# Patient Record
Sex: FEMALE | Race: WHITE | NOT HISPANIC OR LATINO | Employment: STUDENT | URBAN - METROPOLITAN AREA
[De-identification: names, ages, dates, MRNs, and addresses within clinical notes are randomized per-mention and may not be internally consistent; named-entity substitution may affect disease eponyms.]

---

## 2017-01-06 ENCOUNTER — OFFICE VISIT (OUTPATIENT)
Dept: FAMILY MEDICINE CLINIC | Facility: CLINIC | Age: 22
End: 2017-01-06

## 2017-01-06 VITALS
WEIGHT: 246 LBS | DIASTOLIC BLOOD PRESSURE: 78 MMHG | HEIGHT: 64 IN | OXYGEN SATURATION: 99 % | HEART RATE: 90 BPM | BODY MASS INDEX: 42 KG/M2 | TEMPERATURE: 98.3 F | SYSTOLIC BLOOD PRESSURE: 122 MMHG

## 2017-01-06 DIAGNOSIS — J02.9 SORE THROAT: Primary | ICD-10-CM

## 2017-01-06 LAB
EXPIRATION DATE: NORMAL
EXPIRATION DATE: NORMAL
HETEROPH AB SER QL LA: NEGATIVE
INTERNAL CONTROL: NORMAL
INTERNAL CONTROL: NORMAL
Lab: NORMAL
Lab: NORMAL
S PYO AG THROAT QL: NEGATIVE

## 2017-01-06 PROCEDURE — 87880 STREP A ASSAY W/OPTIC: CPT | Performed by: NURSE PRACTITIONER

## 2017-01-06 PROCEDURE — 86308 HETEROPHILE ANTIBODY SCREEN: CPT | Performed by: NURSE PRACTITIONER

## 2017-01-06 PROCEDURE — 99213 OFFICE O/P EST LOW 20 MIN: CPT | Performed by: NURSE PRACTITIONER

## 2017-01-06 NOTE — MR AVS SNAPSHOT
"                        Shannon Schultz   1/6/2017 2:00 PM   Office Visit    Provider:  ROSELIA Salinas   Department:  [facility]   Dept Phone:  [phone]                Your Full Care Plan              Today's Medication Changes          These changes are accurate as of: 1/6/17  2:45 PM.  If you have any questions, ask your nurse or doctor.               Stop taking medication(s)listed here:     ciprofloxacin 500 MG tablet   Commonly known as:  CIPRO   Stopped by:  ROSELIA Salinas                      Your Updated Medication List          This list is accurate as of: 1/6/17  2:45 PM.  Always use your most recent med list.                mometasone 50 MCG/ACT nasal spray   Commonly known as:  NASONEX   2 sprays into each nostril Daily.       omeprazole 20 MG capsule   Commonly known as:  priLOSEC   Take 1 capsule by mouth Daily.       VESTURA 3-0.02 MG per tablet   Generic drug:  drospirenone-ethinyl estradiol   Take 1 tablet by mouth daily.               We Performed the Following     POC Infectious Mononucleosis Antibody     POC Rapid Strep A       You Were Diagnosed With        Codes Comments    Sore throat    -  Primary ICD-10-CM: J02.9  ICD-9-CM: 462       Instructions     None    Patient Instructions History      MyChart Signup     Our records indicate that you have declined [facility] [employer] signup. If you would like to sign up for [employer], please email [facility] [email or web address] or call [phone] to obtain an activation code.             Other Info from Your Visit           Allergies     Amoxil [Amoxicillin]  Rash    Ceftin [Cefuroxime Axetil]  Rash    Zithromax [Azithromycin]  Rash      Reason for Visit     Sore Throat           Vital Signs     Blood Pressure Pulse Temperature Height Weight Last Menstrual Period    122/78 90 98.3 °F (36.8 °C) (Oral) 64\" (162.6 cm) 246 lb (112 kg) 12/23/2016 (Approximate)    Oxygen Saturation Body Mass Index Smoking " Status             99% 42.23 kg/m2 Never Smoker         Problems and Diagnoses Noted     Sore throat    -  Primary      Results     POC Rapid Strep A      Component Value Standard Range & Units    Rapid Strep A Screen Negative Negative, VALID, INVALID, Not Performed    Internal Control Passed Passed    Lot Number [id number]     Expiration Date 3/2018                 POC Infectious Mononucleosis Antibody      Component Value Standard Range & Units    Monospot Negative Negative    Internal Control Passed Passed    Lot Number [id number]     Expiration Date 03/31/2018

## 2017-01-06 NOTE — PROGRESS NOTES
"Conor Schultz is a 21 y.o. female who presents c/o a sore throat, swollen lymph nodes x 1 month. Was treated for strep with cipro at [facility] 3 weeks ago. Was given clindamycin in [place] over holiday break at a different clinic.     History of Present Illness   Was not tested 2nd time, as was given cipro initially. 1st test was + for rapid strep, 2nd time not tested, was treated with clinda. Now throat pain waxes and wanes, lymph nodes can hurt all way to ears. Otherwise no symptoms, though at onset was on couch for 5 days. 2nd round of illness with no improvement, minimal with round of clindamycin.     Initial round of antibiotics 2nd week of December, finished clindamycin at least 10 days ago, feeling a little better than when started, less systemic symptoms and only localized tonsil and lymph symptoms worse at night. No cat scratches or other bites. No skin lesions  The following portions of the patient's history were reviewed and updated as appropriate: allergies, current medications, past family history, past medical history, past social history, past surgical history and problem list.    Review of Systems   Constitutional: Negative.  Negative for chills and fever.   HENT: Positive for sore throat. Negative for congestion, ear pain, mouth sores, rhinorrhea, sinus pressure and sneezing.    Eyes: Negative.    Respiratory: Negative for cough.    Cardiovascular: Negative.    Hematological: Positive for adenopathy.   Psychiatric/Behavioral: Negative.      Visit Vitals   • /78   • Pulse 90   • Temp 98.3 °F (36.8 °C) (Oral)   • Ht 64\" (162.6 cm)   • Wt 246 lb (112 kg)   • LMP 12/23/2016 (Approximate)   • SpO2 99%   • BMI 42.23 kg/m2     Objective   Physical Exam   Constitutional: She appears well-developed and well-nourished. No distress.   HENT:   Head: Normocephalic.   Right Ear: Tympanic membrane normal.   Left Ear: Tympanic membrane normal.   Nose: Nose normal.   Mouth/Throat: Posterior " oropharyngeal erythema (mild lacy redness) present. No oropharyngeal exudate or posterior oropharyngeal edema. Tonsils are 2+ on the right. Tonsils are 2+ on the left. No tonsillar exudate.   Cardiovascular: Normal rate, regular rhythm, normal heart sounds and intact distal pulses.    Pulmonary/Chest: Effort normal and breath sounds normal. No respiratory distress. She has no wheezes. She has no rales.   Lymphadenopathy:        Head (right side): Tonsillar adenopathy present. No preauricular, no posterior auricular and no occipital adenopathy present.        Head (left side): Tonsillar adenopathy present. No preauricular, no posterior auricular and no occipital adenopathy present.     She has no cervical adenopathy.        Right cervical: No superficial cervical, no deep cervical and no posterior cervical adenopathy present.       Left cervical: No superficial cervical, no deep cervical and no posterior cervical adenopathy present.   Skin: She is not diaphoretic.   Psychiatric: She has a normal mood and affect. Judgment and thought content normal.   Nursing note and vitals reviewed.    Assessment/Plan   Problems Addressed this Visit     None      Visit Diagnoses     Sore throat    -  Primary    Relevant Orders    POC Rapid Strep A        Would treat throat soreness with ibuprofen and fluids, FU if not settling in another week. Consider antibody testing for EBV, as monospot and rapid strep negative today.

## 2017-05-24 RX ORDER — OMEPRAZOLE 20 MG/1
CAPSULE, DELAYED RELEASE ORAL
Qty: 30 CAPSULE | Refills: 0 | Status: SHIPPED | OUTPATIENT
Start: 2017-05-24 | End: 2017-08-02 | Stop reason: SDUPTHER

## 2017-06-23 DIAGNOSIS — Z01.419 ENCOUNTER FOR CERVICAL PAP SMEAR WITH PELVIC EXAM: ICD-10-CM

## 2017-07-14 ENCOUNTER — PROCEDURE VISIT (OUTPATIENT)
Dept: FAMILY MEDICINE CLINIC | Facility: CLINIC | Age: 22
End: 2017-07-14

## 2017-07-14 VITALS
TEMPERATURE: 98.5 F | SYSTOLIC BLOOD PRESSURE: 128 MMHG | WEIGHT: 227 LBS | HEIGHT: 64 IN | HEART RATE: 102 BPM | DIASTOLIC BLOOD PRESSURE: 78 MMHG | OXYGEN SATURATION: 99 % | BODY MASS INDEX: 38.76 KG/M2

## 2017-07-14 DIAGNOSIS — Z12.4 PAP SMEAR FOR CERVICAL CANCER SCREENING: Primary | ICD-10-CM

## 2017-07-14 PROCEDURE — 99395 PREV VISIT EST AGE 18-39: CPT | Performed by: NURSE PRACTITIONER

## 2017-07-14 RX ORDER — ALBUTEROL SULFATE 90 UG/1
2 AEROSOL, METERED RESPIRATORY (INHALATION) EVERY 4 HOURS PRN
Qty: 6.7 G | Refills: 0 | Status: SHIPPED | OUTPATIENT
Start: 2017-07-14

## 2017-07-14 NOTE — PROGRESS NOTES
"Conor Schultz is a 22 y.o. female who presents to update pap smear.     History of Present Illness   Last pap 7/12/16, normal, not currently sexually active. Last period 2-3 months ago with OCPs, has been irregular.   FH: no FH breast, colon, cervical, or uterine cancers. +HPV vaccination    Occasional nighttime wheezing with seasonal changes only, 1 night weekly, none current, has had albuterol on hand in past to manage on top of nasonex.  The following portions of the patient's history were reviewed and updated as appropriate: allergies, current medications, past family history, past medical history, past social history, past surgical history and problem list.    Review of Systems   Constitutional: Negative.    HENT: Negative.    Respiratory: Positive for wheezing.    Gastrointestinal: Negative for constipation (no changes) and diarrhea.   Genitourinary: Negative.    Musculoskeletal: Negative.    Allergic/Immunologic: Positive for environmental allergies.   Psychiatric/Behavioral: Negative.    All other systems reviewed and are negative.    /78  Pulse 102  Temp 98.5 °F (36.9 °C) (Oral)   Ht 64\" (162.6 cm)  Wt 227 lb (103 kg)  SpO2 99%  BMI 38.96 kg/m2    Objective   Physical Exam   Constitutional: She appears well-developed and well-nourished.   Abdominal: Soft. Normal appearance. There is no tenderness. Hernia confirmed negative in the right inguinal area and confirmed negative in the left inguinal area.   Genitourinary: Vagina normal. Pelvic exam was performed with patient supine. There is no rash, tenderness, lesion or injury on the right labia. There is no rash, tenderness, lesion or injury on the left labia. Uterus is not deviated, not enlarged, not fixed and not tender. Cervix exhibits discharge. Cervix exhibits no motion tenderness and no friability. Right adnexum displays no mass, no tenderness and no fullness. Left adnexum displays no mass, no tenderness and no fullness. "   Lymphadenopathy:        Right: No inguinal adenopathy present.        Left: No inguinal adenopathy present.   Nursing note and vitals reviewed.    Assessment/Plan   Problems Addressed this Visit     None      Visit Diagnoses     Pap smear for cervical cancer screening    -  Primary    Relevant Orders    Pap IG, Rfx HPV ASCU        Suspect normal pap, repeat 1 yr pending results. Reasonable for prn albuterol inhaler use, recommend office check up if needed >1-2 x weekly during seasonal changes.

## 2017-07-18 ENCOUNTER — TELEPHONE (OUTPATIENT)
Dept: FAMILY MEDICINE CLINIC | Facility: CLINIC | Age: 22
End: 2017-07-18

## 2017-07-18 LAB
CONV .: NORMAL
CYTOLOGIST CVX/VAG CYTO: NORMAL
CYTOLOGY CVX/VAG DOC THIN PREP: NORMAL
DX ICD CODE: NORMAL
HIV 1 & 2 AB SER-IMP: NORMAL
OTHER STN SPEC: NORMAL
PATH REPORT.FINAL DX SPEC: NORMAL
STAT OF ADQ CVX/VAG CYTO-IMP: NORMAL

## 2017-08-02 DIAGNOSIS — Z01.419 ENCOUNTER FOR CERVICAL PAP SMEAR WITH PELVIC EXAM: ICD-10-CM

## 2017-08-02 RX ORDER — OMEPRAZOLE 20 MG/1
CAPSULE, DELAYED RELEASE ORAL
Qty: 30 CAPSULE | Refills: 11 | Status: SHIPPED | OUTPATIENT
Start: 2017-08-02 | End: 2018-07-16 | Stop reason: SDUPTHER

## 2017-12-13 RX ORDER — MOMETASONE FUROATE 50 UG/1
SPRAY, METERED NASAL
Qty: 17 G | Refills: 0 | Status: SHIPPED | OUTPATIENT
Start: 2017-12-13 | End: 2018-09-19 | Stop reason: SDUPTHER

## 2018-07-16 ENCOUNTER — OFFICE VISIT (OUTPATIENT)
Dept: FAMILY MEDICINE CLINIC | Facility: CLINIC | Age: 23
End: 2018-07-16

## 2018-07-16 VITALS
TEMPERATURE: 99 F | BODY MASS INDEX: 40.29 KG/M2 | HEIGHT: 64 IN | DIASTOLIC BLOOD PRESSURE: 64 MMHG | WEIGHT: 236 LBS | HEART RATE: 95 BPM | OXYGEN SATURATION: 99 % | SYSTOLIC BLOOD PRESSURE: 118 MMHG

## 2018-07-16 DIAGNOSIS — Z01.419 ENCOUNTER FOR CERVICAL PAP SMEAR WITH PELVIC EXAM: ICD-10-CM

## 2018-07-16 DIAGNOSIS — F41.9 ANXIETY: Primary | ICD-10-CM

## 2018-07-16 PROCEDURE — 99395 PREV VISIT EST AGE 18-39: CPT | Performed by: NURSE PRACTITIONER

## 2018-07-16 RX ORDER — ESCITALOPRAM OXALATE 10 MG/1
10 TABLET ORAL DAILY
Qty: 30 TABLET | Refills: 11 | Status: SHIPPED | OUTPATIENT
Start: 2018-07-16 | End: 2018-07-23 | Stop reason: SDUPTHER

## 2018-07-16 RX ORDER — OMEPRAZOLE 20 MG/1
20 CAPSULE, DELAYED RELEASE ORAL DAILY
Qty: 30 CAPSULE | Refills: 11 | Status: SHIPPED | OUTPATIENT
Start: 2018-07-16 | End: 2018-07-23 | Stop reason: SDUPTHER

## 2018-07-16 NOTE — PROGRESS NOTES
"Conor Schultz is a 23 y.o. female who presents for a well woman exam.     History of Present Illness    currently trying to prevent pregnancy. Having issues with birth control, as new generic, now with periods. Frustrated with this.   Finished master's in , working on doctorate.   In therapy for 1 yr, some progress, but wondering about lexapro. Thinks anxiety is increasing.   The following portions of the patient's history were reviewed and updated as appropriate: allergies, current medications, past family history, past medical history, past social history, past surgical history and problem list.    Review of Systems   Constitutional: Negative for activity change, unexpected weight gain and unexpected weight loss.   Endocrine: Negative for breast discharge.   Genitourinary: Negative for genital sores, pelvic pain, vaginal bleeding, vaginal discharge, vaginal pain, breast lump and breast pain.   Psychiatric/Behavioral: Positive for sleep disturbance (takes benadryl for sleep), depressed mood and stress. Negative for agitation, behavioral problems, decreased concentration, dysphoric mood, hallucinations, self-injury, suicidal ideas and negative for hyperactivity. The patient is nervous/anxious.      /64   Pulse 95   Temp 99 °F (37.2 °C) (Oral)   Ht 162.6 cm (64\")   Wt 107 kg (236 lb)   SpO2 99%   BMI 40.51 kg/m²     Objective   Physical Exam   Constitutional: She appears well-developed and well-nourished.   Abdominal: Soft. She exhibits no distension. There is no tenderness.   Genitourinary: Vagina normal, uterus normal and cervix normal. Pelvic exam was performed with patient supine. There is no rash, tenderness, lesion, injury or Bartholin's cyst on the right labia. There is no rash, tenderness, lesion, injury or Bartholin's cyst on the left labia. Uterus is anteverted.   Cervix is nulliparous. Right adnexum is non-palpable.Left adnexum is non-palpable.  Lymphadenopathy:        " Right: No inguinal adenopathy present.        Left: No inguinal adenopathy present.   Skin: Skin is warm and dry.   Psychiatric: She has a normal mood and affect. Her behavior is normal. Judgment and thought content normal.   Nursing note and vitals reviewed.    Assessment/Plan   Problems Addressed this Visit     None      Visit Diagnoses     Anxiety    -  Primary    Relevant Medications    escitalopram (LEXAPRO) 10 MG tablet    Encounter for cervical Pap smear with pelvic exam        Relevant Medications    VESTURA 3-0.02 MG per tablet    Other Relevant Orders    Pap IG, Rfx HPV ASCU        Discussed starting lexapro at 1/2 dose daily for 1-2 weeks, so as not to flare anxiety. Instructions for management given. As not sexually active, declines STD testing today.   FU if mood not settling 1 month.

## 2018-07-19 DIAGNOSIS — Z01.419 ENCOUNTER FOR CERVICAL PAP SMEAR WITH PELVIC EXAM: ICD-10-CM

## 2018-07-23 DIAGNOSIS — F41.9 ANXIETY: ICD-10-CM

## 2018-07-23 RX ORDER — OMEPRAZOLE 20 MG/1
20 CAPSULE, DELAYED RELEASE ORAL DAILY
Qty: 90 CAPSULE | Refills: 3 | Status: SHIPPED | OUTPATIENT
Start: 2018-07-23

## 2018-07-23 RX ORDER — ESCITALOPRAM OXALATE 10 MG/1
10 TABLET ORAL DAILY
Qty: 90 TABLET | Refills: 3 | Status: SHIPPED | OUTPATIENT
Start: 2018-07-23

## 2018-09-19 RX ORDER — MOMETASONE FUROATE 50 UG/1
2 SPRAY, METERED NASAL DAILY
Qty: 17 G | Refills: 1 | Status: SHIPPED | OUTPATIENT
Start: 2018-09-19 | End: 2019-01-23 | Stop reason: SDUPTHER

## 2019-01-23 RX ORDER — MOMETASONE FUROATE 50 UG/1
SPRAY, METERED NASAL
Qty: 17 G | Refills: 1 | Status: SHIPPED | OUTPATIENT
Start: 2019-01-23 | End: 2019-03-10 | Stop reason: SDUPTHER

## 2019-03-11 RX ORDER — MOMETASONE FUROATE 50 UG/1
SPRAY, METERED NASAL
Qty: 17 G | Refills: 1 | Status: SHIPPED | OUTPATIENT
Start: 2019-03-11

## 2019-08-13 RX ORDER — OMEPRAZOLE 20 MG/1
CAPSULE, DELAYED RELEASE ORAL
Qty: 30 CAPSULE | Refills: 2 | OUTPATIENT
Start: 2019-08-13

## 2019-08-13 NOTE — TELEPHONE ENCOUNTER
Patient says she has transferred her care and will notify pharmacy to send refill request to the correct place.

## 2019-08-23 DIAGNOSIS — Z01.419 ENCOUNTER FOR CERVICAL PAP SMEAR WITH PELVIC EXAM: ICD-10-CM

## 2019-08-23 DIAGNOSIS — F41.9 ANXIETY: ICD-10-CM

## 2019-08-23 RX ORDER — ESCITALOPRAM OXALATE 10 MG/1
TABLET ORAL
Qty: 30 TABLET | Refills: 2 | OUTPATIENT
Start: 2019-08-23

## 2019-08-27 DIAGNOSIS — Z01.419 ENCOUNTER FOR CERVICAL PAP SMEAR WITH PELVIC EXAM: ICD-10-CM
